# Patient Record
Sex: FEMALE | Race: WHITE
[De-identification: names, ages, dates, MRNs, and addresses within clinical notes are randomized per-mention and may not be internally consistent; named-entity substitution may affect disease eponyms.]

---

## 2021-01-13 ENCOUNTER — HOSPITAL ENCOUNTER (OUTPATIENT)
Dept: HOSPITAL 47 - RADMRIMAIN | Age: 26
Discharge: HOME | End: 2021-01-13
Attending: ORTHOPAEDIC SURGERY
Payer: COMMERCIAL

## 2021-01-13 DIAGNOSIS — M85.641: Primary | ICD-10-CM

## 2021-01-13 NOTE — MR
EXAMINATION TYPE: MR hand RT wo con

 

DATE OF EXAM: 1/13/2021

 

COMPARISON: NONE

 

HISTORY: 25-year-old female, M79.641, right hand pain X 10 years

 

TECHNIQUE: Multiplanar, multisequence images of the right hand were obtained without IV contrast. 

 

FINDINGS: 

No marginal erosions. No osseous edema or suspicious bone marrow replacement.

 

There is a lobulated cystic structure along the palmar aspect at the level of the distal shaft of the
 second metacarpal. This is located along the radial margin of the flexor tendons measuring 1.4 cm lo
ng by 0.8 cm wide by 0.7 mm thick. Some internal low signal foci are present, probable loose bodies o
r debris.

 

No other discrete soft tissue abnormality is seen.

 

No sizable joint effusion.

 

 

IMPRESSION: 

A 1.4 x 0.8 x 0.7 cm lobulated cystic structure along the palmar aspect of the hand at the level of t
he distal second metacarpal along the radial margin of the second flexor tendon. Consider a ganglion 
cyst of the tendon sheath containing some debris or loose bodies. Targeted ultrasound may be helpful 
to confirm the cystic nature.

## 2021-02-19 ENCOUNTER — HOSPITAL ENCOUNTER (OUTPATIENT)
Dept: HOSPITAL 47 - OR | Age: 26
Discharge: HOME | End: 2021-02-19
Attending: ORTHOPAEDIC SURGERY
Payer: COMMERCIAL

## 2021-02-19 VITALS — SYSTOLIC BLOOD PRESSURE: 130 MMHG | HEART RATE: 77 BPM | DIASTOLIC BLOOD PRESSURE: 90 MMHG

## 2021-02-19 VITALS — TEMPERATURE: 97 F | RESPIRATION RATE: 16 BRPM

## 2021-02-19 VITALS — BODY MASS INDEX: 32.4 KG/M2

## 2021-02-19 DIAGNOSIS — E11.9: ICD-10-CM

## 2021-02-19 DIAGNOSIS — Z98.890: ICD-10-CM

## 2021-02-19 DIAGNOSIS — Z88.0: ICD-10-CM

## 2021-02-19 DIAGNOSIS — E78.5: ICD-10-CM

## 2021-02-19 DIAGNOSIS — Z79.84: ICD-10-CM

## 2021-02-19 DIAGNOSIS — Z97.3: ICD-10-CM

## 2021-02-19 DIAGNOSIS — M67.441: Primary | ICD-10-CM

## 2021-02-19 DIAGNOSIS — Z83.3: ICD-10-CM

## 2021-02-19 DIAGNOSIS — Z87.898: ICD-10-CM

## 2021-02-19 LAB — GLUCOSE BLD-MCNC: 252 MG/DL (ref 75–99)

## 2021-02-19 PROCEDURE — 84703 CHORIONIC GONADOTROPIN ASSAY: CPT

## 2021-02-19 PROCEDURE — 88304 TISSUE EXAM BY PATHOLOGIST: CPT

## 2021-02-19 PROCEDURE — 26160 REMOVE TENDON SHEATH LESION: CPT

## 2021-02-19 NOTE — P.OP
Date of Procedure: 02/19/21


Procedure(s) Performed: 


PREOPERATIVE DIAGNOSES: 


1.  Right index finger tendon sheath mass 


 


POSTOPERATIVE DIAGNOSES: 


1.  Right index finger tendon sheath mass


 


PROCEDURES PERFORMED:





1.   Right index finger tendon sheath mass open excisional biopsy


  


ANESTHESIA: Local with IV sedation


 


ASSISTANT: .  None





COMPLICATIONS: None 





ESTIMATED BLOOD LOSS: 1 mL.





DISPOSITION: To post-anesthesia care unit





INDICATIONS: .  Bertha is a 25-year-old female with a history of painful right 

index finger tendon sheath mass.  This has been very painful and bothersome.  

MRI has read it as a angling cyst associated with the tendon sheath with 

possibly intra-cyst loose bodies or debris.  She wishes to have it removed.  I 

recommended open excision.  The patient has signed the consent form and wishes 

to proceed with surgery after full explanation of the risks and potential 

complications.  I have explained these as being inclusive of, but not limited 

to: Bleeding, infection, scarring, discomfort, blood vessel and/or nerve damage,

incomplete release, flexion contracture, stiffness, and other risks.





PROCEDURE: After appropriate consent was obtained, the patient was taken to the 

operating room placed in the supine position.  Anesthesia was initiated, and 

after confirmation of adequate anesthesia, the patient was carefully positioned.

Care was taken to make sure that all pressure points were adequately padded.  

Prepping and draping were completed in the usual aseptic fashion using 

ChloraPrep.  Timeout was called, confirming patient identity, side, procedure, 

and administration of antibiotics.  No antibiotics were given as this was an 

uncomplicated hand case involving only soft tissue.  The limb was exsanguinated 

with an Esmarch bandage and the tourniquet was inflated to 200 mmHg.  Total 

tourniquet time for the case was approximately 12 minutes.





The hand was positioned on a padded aluminum frame.  Local infiltration of 

anesthetic was performed using 2% lidocaine along the area of the planned 

incision .  An incision approximately 3 cm in total length was created over the 

palpable mass in [employer] fashion.  Blunt dissection then proceeded down to the 

mass which was located in a subcutaneous position but adherent to the underlying

tendon sheath.  The mass was reddish maroon in color but cystic in consistency. 

Tendon sheath was exposed medially and laterally and retractors were applied to 

retract neurovascular structures.  Appearance of this mass was consistent with 

cyst associated with the tendon sheath with intracyst hemorrhage.  The mass was 

carefully dissected free of the surrounding tissues and removed.  It was sent 

for pathology analysis in formalin.  Dressing was applied after irrigation and 

use of bipolar electrocautery and pressure for hemostasis.





Tourniquet was deflated and pressure was held over the incision for 3 minutes 

for additional hemostasis.





Patient tolerated the procedure well and taken to recovery room in stable 

condition. Sponge and needle counts were correct.